# Patient Record
Sex: FEMALE | Race: WHITE | NOT HISPANIC OR LATINO | Employment: OTHER | URBAN - NONMETROPOLITAN AREA
[De-identification: names, ages, dates, MRNs, and addresses within clinical notes are randomized per-mention and may not be internally consistent; named-entity substitution may affect disease eponyms.]

---

## 2020-01-14 ENCOUNTER — OFFICE VISIT (OUTPATIENT)
Dept: URGENT CARE | Facility: PHYSICIAN GROUP | Age: 75
End: 2020-01-14
Payer: MEDICARE

## 2020-01-14 VITALS
SYSTOLIC BLOOD PRESSURE: 124 MMHG | HEIGHT: 65 IN | WEIGHT: 164 LBS | RESPIRATION RATE: 16 BRPM | DIASTOLIC BLOOD PRESSURE: 84 MMHG | OXYGEN SATURATION: 96 % | HEART RATE: 56 BPM | BODY MASS INDEX: 27.32 KG/M2 | TEMPERATURE: 97.7 F

## 2020-01-14 DIAGNOSIS — J01.90 ACUTE BACTERIAL SINUSITIS: ICD-10-CM

## 2020-01-14 DIAGNOSIS — B96.89 ACUTE BACTERIAL SINUSITIS: ICD-10-CM

## 2020-01-14 PROBLEM — I48.91 A-FIB (HCC): Status: ACTIVE | Noted: 2019-01-08

## 2020-01-14 PROBLEM — L90.0 LICHEN SCLEROSUS: Status: ACTIVE | Noted: 2017-04-26

## 2020-01-14 PROBLEM — N39.3 STRESS INCONTINENCE: Status: ACTIVE | Noted: 2020-01-14

## 2020-01-14 PROBLEM — J33.9 NASAL POLYPOSIS: Status: ACTIVE | Noted: 2020-01-14

## 2020-01-14 PROBLEM — M85.80 OSTEOPENIA: Status: ACTIVE | Noted: 2018-02-28

## 2020-01-14 PROBLEM — J98.01 BRONCHOSPASM: Status: ACTIVE | Noted: 2020-01-14

## 2020-01-14 PROBLEM — M25.511 RIGHT SHOULDER PAIN: Status: ACTIVE | Noted: 2017-12-04

## 2020-01-14 PROBLEM — M25.561 ANTERIOR KNEE PAIN, RIGHT: Status: ACTIVE | Noted: 2017-12-04

## 2020-01-14 PROBLEM — Z91.09 ENVIRONMENTAL ALLERGIES: Status: ACTIVE | Noted: 2020-01-14

## 2020-01-14 PROBLEM — I63.9 STROKE (HCC): Status: ACTIVE | Noted: 2018-01-19

## 2020-01-14 PROCEDURE — 99204 OFFICE O/P NEW MOD 45 MIN: CPT | Performed by: PHYSICIAN ASSISTANT

## 2020-01-14 RX ORDER — APIXABAN 5 MG/1
TABLET, FILM COATED ORAL
COMMUNITY
Start: 2020-01-09

## 2020-01-14 RX ORDER — IBUPROFEN 800 MG
TABLET ORAL
COMMUNITY

## 2020-01-14 RX ORDER — UREA 10 %
LOTION (ML) TOPICAL
COMMUNITY

## 2020-01-14 RX ORDER — PANTOPRAZOLE SODIUM 40 MG/1
TABLET, DELAYED RELEASE ORAL
COMMUNITY
Start: 2019-11-21

## 2020-01-14 RX ORDER — PANTOPRAZOLE SODIUM 40 MG/1
40 TABLET, DELAYED RELEASE ORAL
COMMUNITY
Start: 2019-11-21

## 2020-01-14 RX ORDER — AMOXICILLIN AND CLAVULANATE POTASSIUM 875; 125 MG/1; MG/1
1 TABLET, FILM COATED ORAL 2 TIMES DAILY
Qty: 14 TAB | Refills: 0 | Status: SHIPPED | OUTPATIENT
Start: 2020-01-14 | End: 2020-01-21

## 2020-01-14 RX ORDER — FLUTICASONE PROPIONATE 50 MCG
SPRAY, SUSPENSION (ML) NASAL
COMMUNITY
Start: 2019-12-23

## 2020-01-14 RX ORDER — HYDROCHLOROTHIAZIDE 12.5 MG/1
TABLET ORAL
COMMUNITY
Start: 2019-11-28

## 2020-01-14 RX ORDER — ESTRADIOL 10 UG/1
1 INSERT VAGINAL
COMMUNITY
Start: 2019-02-19

## 2020-01-14 RX ORDER — FLUTICASONE PROPIONATE 50 MCG
2 SPRAY, SUSPENSION (ML) NASAL
COMMUNITY
Start: 2019-12-23

## 2020-01-14 RX ORDER — ATORVASTATIN CALCIUM 40 MG/1
TABLET, FILM COATED ORAL
COMMUNITY
Start: 2020-01-09

## 2020-01-14 RX ORDER — ATORVASTATIN CALCIUM 40 MG/1
40 TABLET, FILM COATED ORAL
COMMUNITY
Start: 2020-01-06 | End: 2021-01-05

## 2020-01-14 RX ORDER — HYDROCHLOROTHIAZIDE 12.5 MG/1
12.5 TABLET ORAL
COMMUNITY
Start: 2019-02-19

## 2020-01-14 RX ORDER — CLOBETASOL PROPIONATE 0.5 MG/G
CREAM TOPICAL
COMMUNITY
Start: 2019-09-06 | End: 2020-09-05

## 2020-01-14 RX ORDER — METOPROLOL SUCCINATE 25 MG/1
12.5 TABLET, EXTENDED RELEASE ORAL
COMMUNITY
Start: 2019-08-06

## 2020-01-14 RX ORDER — ASPIRIN 325 MG
325 TABLET ORAL
COMMUNITY

## 2020-01-14 RX ORDER — ALBUTEROL SULFATE 90 UG/1
2 AEROSOL, METERED RESPIRATORY (INHALATION)
COMMUNITY
Start: 2018-05-02

## 2020-01-14 RX ORDER — MONTELUKAST SODIUM 10 MG/1
10 TABLET ORAL
COMMUNITY
Start: 2019-05-03 | End: 2020-05-02

## 2020-01-14 NOTE — PROGRESS NOTES
Chief Complaint   Patient presents with   • Sinus Problem       HISTORY OF PRESENT ILLNESS: Patient is a 74 y.o. female who presents today because she has a 10-day history of left-sided nasal and sinus pain, pressure, drainage and congestion.  She does have a history of seasonal allergies, has been using nasal rinses, Flonase, decongestants without improvement    Patient Active Problem List    Diagnosis Date Noted   • Bronchospasm 01/14/2020   • Environmental allergies 01/14/2020   • Nasal polyposis 01/14/2020   • Stress incontinence 01/14/2020   • A-fib ([facility]) 01/08/2019   • Osteopenia 02/28/2018   • Stroke ([facility]) 01/19/2018   • Anterior knee pain, right 12/04/2017   • Right shoulder pain 12/04/2017   • Lichen sclerosus 04/26/2017   • Gastroesophageal reflux disease with esophagitis 01/19/2016   • Diverticulosis of colon 01/27/2015   • Hx of adenomatous colonic polyps 01/27/2015   • Osteoarthritis of right knee 01/20/2015   • Hypertension 09/01/2013   • Hyperlipidemia 09/01/2013   • Essential hypertension, benign 09/13/2011   • Asthma 04/07/2011   • Vaginal dryness 12/09/2010       Allergies:Aspirin; Atorvastatin; Hydrocodone-acetaminophen; Lisinopril; Losartan; Prednisone; and Vicodin [apap-fd&c yellow #10 [place]-hydrocodone]    Current Outpatient Medications Ordered in Epic   Medication Sig Dispense Refill   • albuterol (PROAIR HFA) 108 (90 Base) MCG/ACT Aero Soln inhalation aerosol Inhale 2 Puffs by mouth.     • apixaban (ELIQUIS) 5mg Tab Take 5 mg by mouth.     • atorvastatin (LIPITOR) 40 MG Tab Take 40 mg by mouth.     • clobetasol (TEMOVATE) 0.05 % Cream Apply  to affected area(s).     • estradiol (VAGIFEM) 10 MCG Tab Insert 1 Tab in vagina.     • fluticasone (FLONASE) 50 MCG/ACT nasal spray Spray 2 Sprays in nose.     • hydroCHLOROthiazide (HYDRODIURIL) 12.5 MG tablet Take 12.5 mg by mouth.     • metoprolol SR (TOPROL XL) 25 MG TABLET SR 24 HR Take 12.5 mg by mouth.     • montelukast (SINGULAIR) 10 MG Tab  "Take 10 mg by mouth.     • pantoprazole (PROTONIX) 40 MG Tablet Delayed Response Take 40 mg by mouth.     • amoxicillin-clavulanate (AUGMENTIN) 875-125 MG Tab Take 1 Tab by mouth 2 times a day for 7 days. 14 Tab 0   • calcium carbonate (OS-KIARA) 1250 (500 Ca) MG chewable tablet Take  by mouth.     • ELIQUIS 5 MG Tab      • aspirin (ASA) 325 MG Tab Take 325 mg by mouth.     • atorvastatin (LIPITOR) 40 MG Tab      • Cholecalciferol (VITAMIN D3) 400 units Cap Take  by mouth.     • fluticasone (FLONASE) 50 MCG/ACT nasal spray      • hydroCHLOROthiazide (HYDRODIURIL) 12.5 MG tablet      • pantoprazole (PROTONIX) 40 MG Tablet Delayed Response        No current Epic-ordered facility-administered medications on file.        History reviewed. No pertinent past medical history.    Social History     Tobacco Use   • Smoking status: Former Smoker     Packs/day: 0.00   • Smokeless tobacco: Never Used   Substance Use Topics   • Alcohol use: Not on file   • Drug use: Not on file       No family status information on file.   History reviewed. No pertinent family history.    ROS:  Review of Systems   Constitutional: Negative for fever, chills, weight loss and malaise/fatigue.   HENT: Negative for ear pain, nosebleeds, positive for nasal and sinus congestion, no sore throat and neck pain.    Eyes: Negative for blurred vision.   Respiratory: Negative for cough, sputum production, shortness of breath and wheezing.    Cardiovascular: Negative for chest pain, palpitations, orthopnea and leg swelling.   Gastrointestinal: Negative for heartburn, nausea, vomiting and abdominal pain.   Genitourinary: Negative for dysuria, urgency and frequency.     Exam:  /84 (BP Location: Right arm, Patient Position: Sitting, BP Cuff Size: Adult)   Pulse (!) 56   Temp 36.5 °C (97.7 °F) (Temporal)   Resp 16   Ht 1.638 m (5' 4.5\")   Wt 74.4 kg (164 lb)   SpO2 96%   General:  Well nourished, well developed female in NAD  Head:Normocephalic, " atraumatic  Eyes: PERRLA, EOM within normal limits, no conjunctival injection, no scleral icterus, visual fields and acuity grossly intact.  Ears: Normal shape and symmetry, no tenderness, no discharge. External canals are without any significant edema or erythema. Tympanic membranes are without any inflammation, no effusion. Gross auditory acuity is intact  Nose: Symmetrical without tenderness, no discharge.  Nasal mucosa on the left is erythematous, edematous, excoriated  Mouth: reasonable hygiene, no erythema exudates or tonsillar enlargement.  Neck: no masses, range of motion within normal limits, no tracheal deviation. No obvious thyroid enlargement.  Pulmonary: chest is symmetrical with respiration, no wheezes, crackles, or rhonchi.  Cardiovascular: regular rate and rhythm without murmurs, rubs, or gallops.  Extremities: no clubbing, cyanosis, or edema.    Please note that this dictation was created using voice recognition software. I have made every reasonable attempt to correct obvious errors, but I expect that there are errors of grammar and possibly content that I did not discover before finalizing the note.    Assessment/Plan:  1. Acute bacterial sinusitis  amoxicillin-clavulanate (AUGMENTIN) 875-125 MG Tab   Continue current regimen    Followup with primary care in the next 7-10 days if not significantly improving, return to the urgent care or go to the emergency room sooner for any worsening of symptoms.